# Patient Record
Sex: MALE | Race: WHITE | NOT HISPANIC OR LATINO | Employment: OTHER | URBAN - METROPOLITAN AREA
[De-identification: names, ages, dates, MRNs, and addresses within clinical notes are randomized per-mention and may not be internally consistent; named-entity substitution may affect disease eponyms.]

---

## 2017-01-20 ENCOUNTER — OFFICE VISIT (OUTPATIENT)
Dept: ORTHOPEDIC SURGERY | Facility: CLINIC | Age: 67
End: 2017-01-20

## 2017-01-20 VITALS — TEMPERATURE: 97.6 F | WEIGHT: 246 LBS | HEIGHT: 74 IN | BODY MASS INDEX: 31.57 KG/M2

## 2017-01-20 DIAGNOSIS — Z96.641 HISTORY OF RIGHT HIP REPLACEMENT: ICD-10-CM

## 2017-01-20 DIAGNOSIS — Z96.641 HISTORY OF TOTAL RIGHT HIP ARTHROPLASTY: Primary | ICD-10-CM

## 2017-01-20 PROCEDURE — 99212 OFFICE O/P EST SF 10 MIN: CPT | Performed by: ORTHOPAEDIC SURGERY

## 2017-01-20 PROCEDURE — 73502 X-RAY EXAM HIP UNI 2-3 VIEWS: CPT | Performed by: ORTHOPAEDIC SURGERY

## 2017-01-20 RX ORDER — FEXOFENADINE HCL 180 MG/1
180 TABLET ORAL NIGHTLY
COMMUNITY

## 2017-01-20 RX ORDER — LORATADINE 10 MG/1
CAPSULE, LIQUID FILLED ORAL
COMMUNITY

## 2017-01-20 NOTE — MR AVS SNAPSHOT
Miguelito White   2017 1:15 PM   Office Visit    Dept Phone:  [phone]   Encounter #:  [id number]    Provider:  Ángel Aldana MD   Department:  [facility]                Your Full Care Plan              Your Updated Medication List          This list is accurate as of: 17  2:10 PM.  Always use your most recent med list.                CLARITIN 10 MG capsule   Generic drug:  Loratadine       fexofenadine 180 MG tablet   Commonly known as:  ALLEGRA               You Were Diagnosed With        Codes Comments    History of total right hip arthroplasty    -  Primary ICD-10-CM: Z96.641  ICD-9-CM: V43.64     History of right hip replacement     ICD-10-CM: Z96.641  ICD-9-CM: V43.64       Instructions     None    Patient Instructions History      Upcoming Appointments     Visit Type Date Time Department    OFFICE VISIT 2017  1:15 PM [facility]      [facility] Signup     [facility] allows you to send messages to your doctor, view your test results, renew your prescriptions, schedule appointments, and more. To sign up, go to [employer] and click on the Sign Up Now link in the New User? box. Enter your [employer] Activation Code exactly as it appears below along with the last four digits of your Social Security Number and your Date of Birth () to complete the sign-up process. If you do not sign up before the expiration date, you must request a new code.    [employer] Activation Code: [email or web address]  Expires: 2/3/2017  2:10 PM    If you have questions, you can email [email or web address] or call [phone] to talk to our [facility] staff. Remember, [employer] is NOT to be used for urgent needs. For medical emergencies, dial 911.               Other Info from Your Visit           Allergies     No Known Allergies      Reason for Visit     Right Hip - Follow-up           Vital Signs     Temperature Height  "Weight Body Mass Index Smoking Status       97.6 °F (36.4 °C) (Temporal Artery ) 74\" (188 cm) 246 lb (112 kg) 31.58 kg/m2 Former Smoker       Problems and Diagnoses Noted     History of total right hip arthroplasty    -  Primary    History of right hip replacement            "

## 2017-01-20 NOTE — PROGRESS NOTES
"Miguelito White : 1950 MRN: [id number] DATE: 2017    Chief Complaint:  Follow up right total hip      SUBJECTIVE:Patient returns today for  annual follow up of right total hip replacement. Patient reports doing well with no unusual complaints. Denies any limitations due to the hip.    OBJECTIVE:    Visit Vitals   • Temp 97.6 °F (36.4 °C) (Temporal Artery )   • Ht 74\" (188 cm)   • Wt 246 lb (112 kg)   • BMI 31.58 kg/m2     Family History   Problem Relation Age of Onset   • No Known Problems Mother    • No Known Problems Father    • No Known Problems Sister    • No Known Problems Brother    • No Known Problems Maternal Aunt    • No Known Problems Maternal Uncle    • No Known Problems Paternal Aunt    • No Known Problems Paternal Uncle    • No Known Problems Maternal Grandmother    • No Known Problems Maternal Grandfather    • No Known Problems Paternal Grandmother    • No Known Problems Paternal Grandfather    • Anesthesia problems Neg Hx    • Broken bones Neg Hx    • Cancer Neg Hx    • Clotting disorder Neg Hx    • Collagen disease Neg Hx    • Diabetes Neg Hx    • Dislocations Neg Hx    • Osteoporosis Neg Hx    • Rheumatologic disease Neg Hx    • Scoliosis Neg Hx    • Severe sprains Neg Hx      Past Medical History   Diagnosis Date   • Hearing deficit      Past Surgical History   Procedure Laterality Date   • Total hip arthroplasty Right [id number]     Social History     Social History   • Marital status: Unknown     Spouse name: N/A   • Number of children: N/A   • Years of education: N/A     Occupational History   • Not on file.     Social History Main Topics   • Smoking status: Former Smoker     Quit date: 2007   • Smokeless tobacco: Not on file   • Alcohol use No   • Drug use: No   • Sexual activity: Defer     Other Topics Concern   • Not on file     Social History Narrative       Review of Systems: 14 point review of systems performed pertinent positives and negatives discussed above, all other " systems are negative    Exam:. The incision is well healed. Range of motion is good without irritability. The calf is soft and nontender with a negative Homans sign. Alignment is neutral. Leg lengths are equal. Good hip flexion and abduction strength.Walks with nonantalgic gait. Intact to light touch with palpable distal pulses.     DIAGNOSTIC STUDIES  Xrays:Xrays 2 view right hip AP and lateral ordered: ordered and reviewed demonstrate a well positioned JENNA without complicating factors.    ASSESSMENT:    Follow up right hip replacement. doing well       PLAN:   Continue activities as tolerated  Follow up DOMINIC Aldana MD  1/20/2017